# Patient Record
Sex: MALE | Race: WHITE | NOT HISPANIC OR LATINO | Employment: OTHER | URBAN - METROPOLITAN AREA
[De-identification: names, ages, dates, MRNs, and addresses within clinical notes are randomized per-mention and may not be internally consistent; named-entity substitution may affect disease eponyms.]

---

## 2018-07-27 NOTE — PATIENT DISCUSSION
CLEARED FOR PHACO OU. RECOMMEND DME PREVENTION WITH NSAID EYEDROPS 1 WEEK BEFORE AND 8 WEEKS AFTER SURGERY.

## 2018-08-27 NOTE — PATIENT DISCUSSION
Surgery Counseling: I have discussed the option of scheduling surgery versus following, as well as the risks, benefits and alternatives of cataract surgery with the patient. It was explained that the surgery is medically indicated at this time, and it can be performed at the patient's option as delaying will cause no further deterioration, therefore there is no rush and there is no harm in waiting to have surgery. It was also explained that there is no guarantee that removing the cataract will improve their vision. The patient understands and desires to proceed with cataract surgery with the implantation of an intraocular lens to improve vision for __driving____. I have given the patient the prescribed regimen of the all-in-one drop to use before and after cataract surgery. They have elected to use the all-in-one option of Pred/Gati/Brom(prednisolone acetate,gatifloxacin,and bromfenac. Patient to administer as directed.

## 2018-08-27 NOTE — PATIENT DISCUSSION
CATARACTS, OU - VISUALLY SIGNIFICANT. SCHEDULE od__ FIRST THEN LATER IN _os_ DISCUSSED OPTION OF __STD OU______________VS __MULITFOCAL OU________________. PATIENT UNDERSTANDS AND DESIRES __STD OU___________________________.

## 2018-10-09 NOTE — PATIENT DISCUSSION
Pre-Op 2nd Eye Counseling: The patient has noticed an improvement in their visual symptoms in the operative eye. The patient complains of decreased vision in the fellow eye when ____DRIVING______. It was explained to the patient that the decision to proceed with cataract surgery in the fellow eye is entirely a separate decision from the surgical eye. All of the same risks, benefits and alternatives are reviewed with the patient again. The patient does feel the vision in the non-operative eye is limiting their daily activities and elects to proceed with cataract surgery in the ___LEFT____ eye. . I have given the patient the prescribed regimen of  drops to use before and after cataract surgery. Patient to administer as directed.

## 2018-10-09 NOTE — PATIENT DISCUSSION
S/P PE IOL, _OD__. DOING WELL. CONTINUE PRED-GATI-BROM IN THE SURGICAL EYE  FOR A TOTAL OF 3 WEEKS USE THEN DISCONTINUE. PATIENT DESIRES __STD_____IOL FOR 2ND EYE. SCHEDULE CATARACT SURGERY.

## 2022-05-24 ENCOUNTER — CONSULTATION/EVALUATION (OUTPATIENT)
Dept: URBAN - METROPOLITAN AREA CLINIC 35 | Facility: CLINIC | Age: 67
End: 2022-05-24

## 2022-05-24 DIAGNOSIS — H33.311: ICD-10-CM

## 2022-05-24 DIAGNOSIS — H25.813: ICD-10-CM

## 2022-05-24 PROCEDURE — 92136TC INTERFEROMETRY - TECHNICAL COMPONENT

## 2022-05-24 PROCEDURE — 92014 COMPRE OPH EXAM EST PT 1/>: CPT

## 2022-05-24 PROCEDURE — V2799PMN IMPRIMIS PRED-MOXI-NEPAF 5ML

## 2022-05-24 ASSESSMENT — TONOMETRY
OS_IOP_MMHG: 16
OD_IOP_MMHG: 16

## 2022-05-24 ASSESSMENT — VISUAL ACUITY
OD_CC: 20/40
OS_CC: 20/200

## 2022-05-24 NOTE — PATIENT DISCUSSION
Pt elects Basic juli ou, aware of need for gls at all focal points, Pt to get clearance from Dr. Melissa Min.

## 2022-06-08 ENCOUNTER — SURGERY/PROCEDURE (OUTPATIENT)
Dept: URBAN - METROPOLITAN AREA CLINIC 35 | Facility: CLINIC | Age: 67
End: 2022-06-08

## 2022-06-08 DIAGNOSIS — H25.813: ICD-10-CM

## 2022-06-08 PROCEDURE — 6698454 REMOVE CATARACT;INSERT LENS (SX ONLY)

## 2022-06-08 NOTE — PATIENT DISCUSSION
Pt elects Basic juli ou, aware of need for gls at all focal points, Pt to get clearance from Dr. Ryan Sher.

## 2022-06-15 ENCOUNTER — POST OP/EVAL OF SECOND EYE (OUTPATIENT)
Dept: URBAN - METROPOLITAN AREA CLINIC 35 | Facility: CLINIC | Age: 67
End: 2022-06-15

## 2022-06-15 ENCOUNTER — SURGERY/PROCEDURE (OUTPATIENT)
Dept: URBAN - METROPOLITAN AREA CLINIC 35 | Facility: CLINIC | Age: 67
End: 2022-06-15

## 2022-06-15 DIAGNOSIS — Z96.1: ICD-10-CM

## 2022-06-15 DIAGNOSIS — H25.811: ICD-10-CM

## 2022-06-15 DIAGNOSIS — H33.311: ICD-10-CM

## 2022-06-15 PROCEDURE — 6698454 REMOVE CATARACT;INSERT LENS (SX ONLY)

## 2022-06-15 PROCEDURE — 92012 INTRM OPH EXAM EST PATIENT: CPT

## 2022-06-15 ASSESSMENT — VISUAL ACUITY
OS_SC: 20/25+2
OD_BAT: 20/80
OD_CC: 20/40

## 2022-06-15 ASSESSMENT — TONOMETRY
OD_IOP_MMHG: 16
OS_IOP_MMHG: 16

## 2022-06-15 NOTE — PATIENT DISCUSSION
Pt elects Basic juli ou, aware of need for gls at all focal points, Pt to get clearance from Dr. Nany Seals.

## 2022-06-15 NOTE — PATIENT DISCUSSION
Pt elects Basic juli ou, aware of need for gls at all focal points, Pt to get clearance from Dr. Joan Castro.

## 2025-03-06 NOTE — PATIENT DISCUSSION
RETINA IS ATTACHED OU: NO HOLES OR TEARS SEEN ON DILATED EXAM TODAY.  RETINAL DETACHMENT SIGNS AND SYMPTOMS REVIEWED Received signed and completed form from Physician's Office.    Completed form will be mailed to patient's address on file as no authorization has been received at this time.  Message sent to Forms Completion Mailing Pool.